# Patient Record
Sex: FEMALE | Race: OTHER | Employment: UNEMPLOYED | URBAN - METROPOLITAN AREA
[De-identification: names, ages, dates, MRNs, and addresses within clinical notes are randomized per-mention and may not be internally consistent; named-entity substitution may affect disease eponyms.]

---

## 2017-04-14 ENCOUNTER — OFFICE VISIT (OUTPATIENT)
Dept: FAMILY MEDICINE CLINIC | Facility: CLINIC | Age: 37
End: 2017-04-14

## 2017-04-14 DIAGNOSIS — Z02.9 ENCOUNTER FOR ADMINISTRATIVE EXAMINATIONS, UNSPECIFIED: Primary | ICD-10-CM

## 2017-04-14 NOTE — PROGRESS NOTES
Alden Senior 31year old female       TUBERCULOSIS SCREENING QUESTIONNAIRE    · Live vaccines in the past month? no  · Any steroid medication in the past month? no  · History of BCG vaccine? no  · If female, are you currently pregnant?   no    · A

## 2017-04-14 NOTE — PROGRESS NOTES
Patient here for Tb testing. Due to [facility] closed on Sunday (Easter) for TB reading, she will come back for PPD placement on Monday.

## 2017-05-09 ENCOUNTER — HOSPITAL ENCOUNTER (OUTPATIENT)
Age: 37
Discharge: HOME | End: 2017-05-09
Payer: MEDICAID

## 2017-05-09 VITALS
SYSTOLIC BLOOD PRESSURE: 122 MMHG | OXYGEN SATURATION: 97 % | RESPIRATION RATE: 18 BRPM | DIASTOLIC BLOOD PRESSURE: 85 MMHG | TEMPERATURE: 99 F | HEART RATE: 100 BPM

## 2017-05-09 DIAGNOSIS — J01.00 ACUTE MAXILLARY SINUSITIS, RECURRENCE NOT SPECIFIED: Primary | ICD-10-CM

## 2017-05-09 PROCEDURE — 87430 STREP A AG IA: CPT | Performed by: PHYSICIAN ASSISTANT

## 2017-05-09 PROCEDURE — 99214 OFFICE O/P EST MOD 30 MIN: CPT

## 2017-05-09 PROCEDURE — 87081 CULTURE SCREEN ONLY: CPT | Performed by: PHYSICIAN ASSISTANT

## 2017-05-09 RX ORDER — AMOXICILLIN AND CLAVULANATE POTASSIUM 875; 125 MG/1; MG/1
1 TABLET, FILM COATED ORAL 2 TIMES DAILY
Qty: 20 TABLET | Refills: 0 | Status: SHIPPED | OUTPATIENT
Start: 2017-05-09 | End: 2017-05-19

## 2017-05-09 RX ORDER — FLUTICASONE PROPIONATE 50 MCG
1 SPRAY, SUSPENSION (ML) NASAL DAILY
Qty: 16 G | Refills: 0 | Status: SHIPPED | OUTPATIENT
Start: 2017-05-09 | End: 2018-05-09

## 2017-05-09 RX ORDER — IBUPROFEN 600 MG/1
600 TABLET ORAL ONCE
Status: COMPLETED | OUTPATIENT
Start: 2017-05-09 | End: 2017-05-09

## 2017-05-09 NOTE — ED INITIAL ASSESSMENT (HPI)
Patient presents with one day h/o sinus pressure/congestion and ear pain x 2 days. +Low grade fever and bodyaches. +Sore throat. Postnasal drip.

## 2017-05-09 NOTE — ED PROVIDER NOTES
Patient Seen in: [facility] In [facility]    History   Patient presents with:  Sinus Problem  Ear Problem Pain (neurosensory)  Sore Throat    Stated Complaint: CONGESTION, EAR PAIN.      HPI    CHIEF COMPLAINT: Sinus Pain, pressure and fever Location:  ENDOSCOPY       Medications :   PANTOPRAZOLE SODIUM OR,  Take by mouth. Amoxicillin-Pot Clavulanate 875-125 MG Oral Tab,  Take 1 tablet by mouth 2 (two) times daily.    Fluticasone Propionate 50 MCG/ACT Nasal Suspension,  1 spray by Nasal ro sinus tenderness noted to maxillary or frontal sinuses without erythema or edema. Eyes:  pupils equal and round no pallor or injection  Throat: There is mild erythema w/o exudates, no tonsillar hypertrophy. no trismus or stridor.   No phonation changes, p medications    Amoxicillin-Pot Clavulanate 875-125 MG Oral Tab  Take 1 tablet by mouth 2 (two) times daily. Qty: 20 tablet Refills: 0    Fluticasone Propionate 50 MCG/ACT Nasal Suspension  1 spray by Nasal route daily.  1 spray each nostril twice a day  Qt

## 2017-05-23 ENCOUNTER — OFFICE VISIT (OUTPATIENT)
Dept: FAMILY MEDICINE CLINIC | Facility: CLINIC | Age: 37
End: 2017-05-23

## 2017-05-23 VITALS
TEMPERATURE: 98 F | BODY MASS INDEX: 37.57 KG/M2 | OXYGEN SATURATION: 98 % | HEART RATE: 98 BPM | SYSTOLIC BLOOD PRESSURE: 110 MMHG | HEIGHT: 61 IN | DIASTOLIC BLOOD PRESSURE: 80 MMHG | WEIGHT: 199 LBS | RESPIRATION RATE: 16 BRPM

## 2017-05-23 DIAGNOSIS — J01.90 ACUTE SINUSITIS TREATED WITH ANTIBIOTICS IN THE PAST 60 DAYS: Primary | ICD-10-CM

## 2017-05-23 PROCEDURE — 99213 OFFICE O/P EST LOW 20 MIN: CPT | Performed by: NURSE PRACTITIONER

## 2017-05-23 RX ORDER — DOXYCYCLINE HYCLATE 100 MG
100 TABLET ORAL 2 TIMES DAILY
Qty: 14 TABLET | Refills: 0 | Status: SHIPPED | OUTPATIENT
Start: 2017-05-23 | End: 2017-05-30

## 2017-05-23 NOTE — PROGRESS NOTES
CHIEF COMPLAINT:   Patient presents with:  Sinus Problem: fever, body aches, cough, ear pain. finished abx 4 days ago. HPI:   Stephanie Zhu is a 31year old female who presents for cold symptoms for over  2  weeks.  Patient had been seen 2 weeks • Gestational diabetes    • Colitis    • Osteoarthritis    • Gout    • Bicornate uterus           Past Surgical History          Comment x's 2    TONSILLECTOMY       N/A 2016    Comment Procedure: ESOPHAGOGASTRODUODENOSCOPY (EGD);   Surgeon: [place] LYMPH:  + anterior cervical lymphadenopathy. ASSESSMENT AND PLAN:   Jennifer Salvador is a 31year old female who presents with Sinus Problem.  Symptoms are consistent with:      ASSESSMENT:  Acute sinusitis treated with antibiotics in the past 60 da · Over-the-counter decongestants may be used unless a similar medicine was prescribed. Nasal sprays work the fastest. Use one that contains phenylephrine or oxymetazoline. First blow the nose gently. Then use the spray.  Do not use these medicines more ofte © 5454-9014 The [address] [place] [address], [address] [employer] [employer] [place]. All rights reserved. This information is not intended as a substitute for professional medical care. Always follow your healthcare professional's instructions.             The

## 2017-11-25 ENCOUNTER — HOSPITAL ENCOUNTER (OUTPATIENT)
Age: 37
Discharge: HOME | End: 2017-11-25
Attending: FAMILY MEDICINE
Payer: MEDICAID

## 2017-11-25 ENCOUNTER — APPOINTMENT (OUTPATIENT)
Dept: CT IMAGING | Age: 37
End: 2017-11-25
Attending: FAMILY MEDICINE
Payer: MEDICAID

## 2017-11-25 VITALS
BODY MASS INDEX: 37 KG/M2 | TEMPERATURE: 97 F | RESPIRATION RATE: 16 BRPM | WEIGHT: 196 LBS | DIASTOLIC BLOOD PRESSURE: 79 MMHG | HEIGHT: 61 IN | OXYGEN SATURATION: 98 % | SYSTOLIC BLOOD PRESSURE: 122 MMHG | HEART RATE: 88 BPM

## 2017-11-25 DIAGNOSIS — N20.0 CALCULUS OF RIGHT KIDNEY: Primary | ICD-10-CM

## 2017-11-25 PROCEDURE — 81002 URINALYSIS NONAUTO W/O SCOPE: CPT | Performed by: FAMILY MEDICINE

## 2017-11-25 PROCEDURE — 99214 OFFICE O/P EST MOD 30 MIN: CPT

## 2017-11-25 PROCEDURE — 80047 BASIC METABLC PNL IONIZED CA: CPT

## 2017-11-25 PROCEDURE — 81025 URINE PREGNANCY TEST: CPT | Performed by: FAMILY MEDICINE

## 2017-11-25 PROCEDURE — 85025 COMPLETE CBC W/AUTO DIFF WBC: CPT | Performed by: FAMILY MEDICINE

## 2017-11-25 PROCEDURE — 74176 CT ABD & PELVIS W/O CONTRAST: CPT | Performed by: FAMILY MEDICINE

## 2017-11-25 PROCEDURE — 96374 THER/PROPH/DIAG INJ IV PUSH: CPT

## 2017-11-25 RX ORDER — KETOROLAC TROMETHAMINE 30 MG/ML
30 INJECTION, SOLUTION INTRAMUSCULAR; INTRAVENOUS ONCE
Status: COMPLETED | OUTPATIENT
Start: 2017-11-25 | End: 2017-11-25

## 2017-11-25 RX ORDER — HYDROCODONE BITARTRATE AND ACETAMINOPHEN 5; 325 MG/1; MG/1
1 TABLET ORAL EVERY 6 HOURS PRN
Qty: 10 TABLET | Refills: 0 | Status: SHIPPED | OUTPATIENT
Start: 2017-11-25 | End: 2017-12-02

## 2017-11-25 RX ORDER — TAMSULOSIN HYDROCHLORIDE 0.4 MG/1
0.4 CAPSULE ORAL DAILY
Qty: 7 CAPSULE | Refills: 0 | Status: SHIPPED | OUTPATIENT
Start: 2017-11-25 | End: 2017-12-02

## 2017-11-25 NOTE — ED PROVIDER NOTES
Patient Seen in: [facility]    History   Patient presents with:  Abdomen/Flank Pain (GI/)    Stated Complaint: RT SIDE PAIN X 5 DAYS    HPI    31year old female presents for right flank pain.  Patient states she has right flan air)    Current:/79   Pulse 88   Temp (!) 97.2 °F (36.2 °C) (Temporal)   Resp 16   Ht 154.9 cm (5' 1\")   Wt 88.9 kg   LMP 11/07/2017   SpO2 98%   BMI 37.03 kg/m²         Physical Exam   Constitutional: She is oriented to person, place, and time.  She involving the left colon and sigmoid colon to the level of the rectum with a few scattered diverticula. This may represent colitis versus under distention. The study is limited due to lack of  oral contrast. The appendix is normal. There is no free fluid.

## 2017-11-25 NOTE — ED INITIAL ASSESSMENT (HPI)
Pt began 4 days ago with rt sided flank pain, and has had some difficulty urinating, but no other urinary symptoms.   Pain radiates from side to both front and occasional back

## (undated) NOTE — MR AVS SNAPSHOT
[facility]  [address]  [phone]               Thank you for choosing us for your health care visit with Justa Crandall NP. We are glad to serve you and happy to provide you with this summary of your visit.   Ple · An expectorant containing guaifenesin may help thin the mucus and promote drainage from the sinuses. · Over-the-counter decongestants may be used unless a similar medicine was prescribed.  Nasal sprays work the fastest. Use one that contains phenylephrin © 8597-9977 [address], [address] [place]. All rights reserved. This information is not intended as a substitute for professional medical care. Always follow your healthcare professional's instructions.              Al information, go to [email or web address] and click on the Sign Up Now link in the [employer] box. Enter your [employer] Activation Code exactly as it appears below along with your Zip Code and Date of Birth to complete the sign-up process.  If you do

## (undated) NOTE — MR AVS SNAPSHOT
[facility]  [address] [id number] [phone]               Thank you for choosing us for your health care visit with Melvin Simmonds, APN. We are glad to serve you and happy to provide you with this summary of your visit.   Please h your Zip Code and Date of Birth to complete the sign-up process. If you do not sign up before the expiration date, you must request a new code.     Your unique [employer] Access Code: [email or web address]  Expires: 6/13/2017  2:19 PM    If you have questions, you can c

## (undated) NOTE — ED AVS SNAPSHOT
[facility] in [address] [place] [address] Drive,[address]    [address] [place] [address]    Phone:  [phone]    Fax:  Ita Riley   MRN: [id number]    Department:  [facility] in [facility]   Date of Visit:  5/9/2017 Discharge Instructions       Return to the emergency department if worse or any concerns    Continue all of your home medications    While taking antibiotics it is recommended that you also take an over the counter probiotics.   If you'd like, you can have You were examined and treated today on an urgent basis only. This was not a substitute for ongoing medical care. Often, one Immediate Care visit does not uncover every injury or illness.  If you have been referred to a primary care or a specialist physicia Michell Crawford[address] ([address]) [address]  [employer] ([address]) [phone] [address] [facility] [address] ([address]) [phone]                Additional Information       We are concerned for your o